# Patient Record
Sex: FEMALE | Race: WHITE | NOT HISPANIC OR LATINO
[De-identification: names, ages, dates, MRNs, and addresses within clinical notes are randomized per-mention and may not be internally consistent; named-entity substitution may affect disease eponyms.]

---

## 2017-01-09 ENCOUNTER — APPOINTMENT (OUTPATIENT)
Dept: SURGERY | Facility: CLINIC | Age: 22
End: 2017-01-09

## 2017-01-09 VITALS
WEIGHT: 131 LBS | SYSTOLIC BLOOD PRESSURE: 105 MMHG | TEMPERATURE: 97.5 F | DIASTOLIC BLOOD PRESSURE: 66 MMHG | OXYGEN SATURATION: 99 % | BODY MASS INDEX: 23.21 KG/M2 | HEART RATE: 60 BPM | HEIGHT: 63 IN

## 2017-01-09 DIAGNOSIS — K82.9 DISEASE OF GALLBLADDER, UNSPECIFIED: ICD-10-CM

## 2017-01-09 DIAGNOSIS — R63.4 ABNORMAL WEIGHT LOSS: ICD-10-CM

## 2017-01-10 PROBLEM — R63.4 RECENT WEIGHT LOSS: Status: ACTIVE | Noted: 2017-01-10

## 2017-01-10 LAB
ALBUMIN SERPL ELPH-MCNC: 4.8 G/DL
ALP BLD-CCNC: 67 U/L
ALT SERPL-CCNC: 9 U/L
ANION GAP SERPL CALC-SCNC: 14 MMOL/L
APTT BLD: 32.1 SEC
AST SERPL-CCNC: 18 U/L
BASOPHILS # BLD AUTO: 0.03 K/UL
BASOPHILS NFR BLD AUTO: 0.3 %
BILIRUB SERPL-MCNC: 0.4 MG/DL
BUN SERPL-MCNC: 12 MG/DL
CALCIUM SERPL-MCNC: 9.9 MG/DL
CHLORIDE SERPL-SCNC: 100 MMOL/L
CO2 SERPL-SCNC: 24 MMOL/L
CREAT SERPL-MCNC: 0.8 MG/DL
EOSINOPHIL # BLD AUTO: 0.07 K/UL
EOSINOPHIL NFR BLD AUTO: 0.8 %
GLUCOSE SERPL-MCNC: 79 MG/DL
HCT VFR BLD CALC: 38.4 %
HGB BLD-MCNC: 13.1 G/DL
IMM GRANULOCYTES NFR BLD AUTO: 0.2 %
INR PPP: 1.07 RATIO
LYMPHOCYTES # BLD AUTO: 2.76 K/UL
LYMPHOCYTES NFR BLD AUTO: 30.7 %
MAN DIFF?: NORMAL
MCHC RBC-ENTMCNC: 32.4 PG
MCHC RBC-ENTMCNC: 34.1 GM/DL
MCV RBC AUTO: 95 FL
MONOCYTES # BLD AUTO: 0.51 K/UL
MONOCYTES NFR BLD AUTO: 5.7 %
NEUTROPHILS # BLD AUTO: 5.6 K/UL
NEUTROPHILS NFR BLD AUTO: 62.3 %
PLATELET # BLD AUTO: 249 K/UL
POTASSIUM SERPL-SCNC: 4.3 MMOL/L
PROT SERPL-MCNC: 7.8 G/DL
PT BLD: 12.1 SEC
RBC # BLD: 4.04 M/UL
RBC # FLD: 12.1 %
SODIUM SERPL-SCNC: 138 MMOL/L
TSH SERPL-ACNC: 1.18 UU/ML
WBC # FLD AUTO: 8.99 K/UL

## 2017-01-11 ENCOUNTER — RESULT REVIEW (OUTPATIENT)
Age: 22
End: 2017-01-11

## 2017-02-10 ENCOUNTER — FORM ENCOUNTER (OUTPATIENT)
Age: 22
End: 2017-02-10

## 2017-02-11 ENCOUNTER — OUTPATIENT (OUTPATIENT)
Dept: OUTPATIENT SERVICES | Facility: HOSPITAL | Age: 22
LOS: 1 days | End: 2017-02-11
Payer: COMMERCIAL

## 2017-02-11 PROCEDURE — 74177 CT ABD & PELVIS W/CONTRAST: CPT

## 2017-02-11 PROCEDURE — 74177 CT ABD & PELVIS W/CONTRAST: CPT | Mod: 26

## 2017-03-28 ENCOUNTER — EMERGENCY (EMERGENCY)
Facility: HOSPITAL | Age: 22
LOS: 1 days | Discharge: HOME | End: 2017-03-28
Attending: EMERGENCY MEDICINE | Admitting: EMERGENCY MEDICINE
Payer: COMMERCIAL

## 2017-03-28 VITALS
WEIGHT: 139.99 LBS | TEMPERATURE: 100 F | RESPIRATION RATE: 19 BRPM | OXYGEN SATURATION: 98 % | HEART RATE: 99 BPM | SYSTOLIC BLOOD PRESSURE: 107 MMHG | DIASTOLIC BLOOD PRESSURE: 69 MMHG

## 2017-03-28 VITALS
DIASTOLIC BLOOD PRESSURE: 68 MMHG | SYSTOLIC BLOOD PRESSURE: 102 MMHG | TEMPERATURE: 101 F | RESPIRATION RATE: 18 BRPM | OXYGEN SATURATION: 98 % | HEART RATE: 68 BPM

## 2017-03-28 DIAGNOSIS — R50.9 FEVER, UNSPECIFIED: ICD-10-CM

## 2017-03-28 DIAGNOSIS — R05 COUGH: ICD-10-CM

## 2017-03-28 DIAGNOSIS — J02.9 ACUTE PHARYNGITIS, UNSPECIFIED: ICD-10-CM

## 2017-03-28 DIAGNOSIS — R42 DIZZINESS AND GIDDINESS: ICD-10-CM

## 2017-03-28 DIAGNOSIS — R55 SYNCOPE AND COLLAPSE: ICD-10-CM

## 2017-03-28 DIAGNOSIS — Z91.013 ALLERGY TO SEAFOOD: ICD-10-CM

## 2017-03-28 DIAGNOSIS — E86.0 DEHYDRATION: ICD-10-CM

## 2017-03-28 LAB
ALBUMIN SERPL ELPH-MCNC: 4 G/DL — SIGNIFICANT CHANGE UP (ref 3.4–5)
ALP SERPL-CCNC: 72 U/L — SIGNIFICANT CHANGE UP (ref 40–120)
ALT FLD-CCNC: 18 U/L — SIGNIFICANT CHANGE UP (ref 12–42)
ANION GAP SERPL CALC-SCNC: 8 MMOL/L — LOW (ref 9–16)
APPEARANCE UR: CLEAR — SIGNIFICANT CHANGE UP
AST SERPL-CCNC: 12 U/L — LOW (ref 15–37)
BASOPHILS NFR BLD AUTO: 0.2 % — SIGNIFICANT CHANGE UP (ref 0–2)
BILIRUB SERPL-MCNC: 0.4 MG/DL — SIGNIFICANT CHANGE UP (ref 0.2–1.2)
BILIRUB UR-MCNC: NEGATIVE — SIGNIFICANT CHANGE UP
BUN SERPL-MCNC: 6 MG/DL — LOW (ref 7–23)
CALCIUM SERPL-MCNC: 8.6 MG/DL — SIGNIFICANT CHANGE UP (ref 8.5–10.5)
CHLORIDE SERPL-SCNC: 100 MMOL/L — SIGNIFICANT CHANGE UP (ref 96–108)
CO2 SERPL-SCNC: 26 MMOL/L — SIGNIFICANT CHANGE UP (ref 22–31)
COLOR SPEC: YELLOW — SIGNIFICANT CHANGE UP
CREAT SERPL-MCNC: 0.99 MG/DL — SIGNIFICANT CHANGE UP (ref 0.5–1.3)
DIFF PNL FLD: NEGATIVE — SIGNIFICANT CHANGE UP
EOSINOPHIL NFR BLD AUTO: 1 % — SIGNIFICANT CHANGE UP (ref 0–6)
EXTRA BLUE TOP TUBE: SIGNIFICANT CHANGE UP
GLUCOSE SERPL-MCNC: 105 MG/DL — HIGH (ref 70–99)
GLUCOSE UR QL: NEGATIVE — SIGNIFICANT CHANGE UP
HCT VFR BLD CALC: 35 % — SIGNIFICANT CHANGE UP (ref 34.5–45)
HGB BLD-MCNC: 11.9 G/DL — SIGNIFICANT CHANGE UP (ref 11.5–15.5)
KETONES UR-MCNC: 15 MG/DL
LEUKOCYTE ESTERASE UR-ACNC: NEGATIVE — SIGNIFICANT CHANGE UP
LIDOCAIN IGE QN: 110 U/L — SIGNIFICANT CHANGE UP (ref 73–393)
LYMPHOCYTES # BLD AUTO: 7 % — LOW (ref 13–44)
MCHC RBC-ENTMCNC: 32.5 PG — SIGNIFICANT CHANGE UP (ref 27–34)
MCHC RBC-ENTMCNC: 34 G/DL — SIGNIFICANT CHANGE UP (ref 32–36)
MCV RBC AUTO: 95.6 FL — SIGNIFICANT CHANGE UP (ref 80–100)
MONOCYTES NFR BLD AUTO: 6.2 % — SIGNIFICANT CHANGE UP (ref 2–14)
NEUTROPHILS NFR BLD AUTO: 85.6 % — HIGH (ref 43–77)
NITRITE UR-MCNC: NEGATIVE — SIGNIFICANT CHANGE UP
PH UR: 8.5 — HIGH (ref 4–8)
PLATELET # BLD AUTO: 243 K/UL — SIGNIFICANT CHANGE UP (ref 150–400)
POTASSIUM SERPL-MCNC: 3.5 MMOL/L — SIGNIFICANT CHANGE UP (ref 3.5–5.3)
POTASSIUM SERPL-SCNC: 3.5 MMOL/L — SIGNIFICANT CHANGE UP (ref 3.5–5.3)
PROT SERPL-MCNC: 7.3 G/DL — SIGNIFICANT CHANGE UP (ref 6.4–8.2)
PROT UR-MCNC: NEGATIVE MG/DL — SIGNIFICANT CHANGE UP
RBC # BLD: 3.66 M/UL — LOW (ref 3.8–5.2)
RBC # FLD: 11.6 % — SIGNIFICANT CHANGE UP (ref 10.3–16.9)
SODIUM SERPL-SCNC: 134 MMOL/L — LOW (ref 135–145)
SP GR SPEC: 1.01 — SIGNIFICANT CHANGE UP (ref 1–1.03)
UROBILINOGEN FLD QL: 0.2 E.U./DL — SIGNIFICANT CHANGE UP
WBC # BLD: 8.9 K/UL — SIGNIFICANT CHANGE UP (ref 3.8–10.5)
WBC # FLD AUTO: 8.9 K/UL — SIGNIFICANT CHANGE UP (ref 3.8–10.5)

## 2017-03-28 PROCEDURE — 71020: CPT | Mod: 26

## 2017-03-28 PROCEDURE — 83690 ASSAY OF LIPASE: CPT

## 2017-03-28 PROCEDURE — 82550 ASSAY OF CK (CPK): CPT

## 2017-03-28 PROCEDURE — 82553 CREATINE MB FRACTION: CPT

## 2017-03-28 PROCEDURE — 93308 TTE F-UP OR LMTD: CPT

## 2017-03-28 PROCEDURE — 76705 ECHO EXAM OF ABDOMEN: CPT | Mod: 26

## 2017-03-28 PROCEDURE — 93010 ELECTROCARDIOGRAM REPORT: CPT | Mod: NC

## 2017-03-28 PROCEDURE — 93308 TTE F-UP OR LMTD: CPT | Mod: 26

## 2017-03-28 PROCEDURE — 85025 COMPLETE CBC W/AUTO DIFF WBC: CPT

## 2017-03-28 PROCEDURE — 99284 EMERGENCY DEPT VISIT MOD MDM: CPT | Mod: 25

## 2017-03-28 PROCEDURE — 80053 COMPREHEN METABOLIC PANEL: CPT

## 2017-03-28 PROCEDURE — 93005 ELECTROCARDIOGRAM TRACING: CPT

## 2017-03-28 PROCEDURE — 76705 ECHO EXAM OF ABDOMEN: CPT

## 2017-03-28 PROCEDURE — 71046 X-RAY EXAM CHEST 2 VIEWS: CPT

## 2017-03-28 PROCEDURE — 81003 URINALYSIS AUTO W/O SCOPE: CPT

## 2017-03-28 PROCEDURE — 84484 ASSAY OF TROPONIN QUANT: CPT

## 2017-03-28 PROCEDURE — 87086 URINE CULTURE/COLONY COUNT: CPT

## 2017-03-28 RX ORDER — SODIUM CHLORIDE 9 MG/ML
1000 INJECTION INTRAMUSCULAR; INTRAVENOUS; SUBCUTANEOUS ONCE
Qty: 0 | Refills: 0 | Status: COMPLETED | OUTPATIENT
Start: 2017-03-28 | End: 2017-03-28

## 2017-03-28 RX ORDER — ACETAMINOPHEN 500 MG
650 TABLET ORAL ONCE
Qty: 0 | Refills: 0 | Status: DISCONTINUED | OUTPATIENT
Start: 2017-03-28 | End: 2017-04-01

## 2017-03-28 RX ADMIN — SODIUM CHLORIDE 2000 MILLILITER(S): 9 INJECTION INTRAMUSCULAR; INTRAVENOUS; SUBCUTANEOUS at 10:00

## 2017-03-28 NOTE — ED PROVIDER NOTE - MEDICAL DECISION MAKING DETAILS
Case d/w Dr. Salguero,  20 yo F with pmhx gallstones presents with lightheadedness. Pt woke up this morning feeling lightheaded and dehydrated, when she got out of bed and poured herself a glass of water she passed out. Pt reports symptoms at this time mostly resolved.  On exam pt with dry mucous membranes, pt with nonfocal neuro exam.  Plan to check EKG, basic labs and txt with ivf's and reassess. Case d/w Dr. Salguero,  22 yo F with pmhx gallstones presents with lightheadedness. Pt woke up this morning feeling lightheaded and dehydrated, when she got out of bed and poured herself a glass of water she passed out. Pt reports symptoms at this time mostly resolved.  On exam pt with dry mucous membranes, pt with nonfocal neuro exam.  Plan to check EKG, basic labs and txt with ivf's and reassess. pt reassessed and reports feeling much improved

## 2017-03-28 NOTE — ED ADULT TRIAGE NOTE - CHIEF COMPLAINT QUOTE
I felt dizzy this morning while I was getting water and fell and hit my head.  + loc.  Also c/o RLQ abd pain x few months but intermittently worse this AM and fever last night.  Took tylenol 9pm. Hx: Gallstones.  Scheduled for gallstone removal in two weeks. Sent in by Dr. Daniel Sanchez. No hx of falls

## 2017-03-28 NOTE — ED PROVIDER NOTE - ATTENDING CONTRIBUTION TO CARE
20 yo F with hx of gallstones, scheduled for surgery next week, presenting with lightheadedness and syncope upon standing from lying position in bed, hitting occiput on ground.  Denies headache other traumatic pain.  Has been dealing with intermittent nausea with food intake and intermittent RUQ pain. Currently no ab pain.  Past 2 days had viral syndrome of cough, sore throat, body aches and subjective fever.  Denies chest pain, sob, rash or LE edema.  Pt has not been eating or drinking well.  Pt well on exam, VS noted with low grade temperature present.  Belly soft nontender, no murphys sign.  No signs of trauma to head.  Nl neuro exam.  Nl cardiac exam no murmurs.  EKG noted with nonspecific ST abn, no old to compare.  Doubt related to current condition.  Labs sent and reassurring.  Bedside sono neg for pericardial effusion and acute mrery.  Given context likely dehydration, responsive to IVF due to gallstones and viral syndrome as cause of syncope.  Do not suspect AMI, PE, myocarditis, pericarditis, tamponade, stroke, acute merry or ICH from syncope trauma.  Plan outpt fu and continued obs of symptoms, PO fluids and rest.

## 2017-03-28 NOTE — ED PROVIDER NOTE - ENMT, MLM
Airway patent, Nasal mucosa clear. Dry mucous membranes, Throat has no vesicles, no oropharyngeal exudates and uvula is midline.

## 2017-03-29 LAB
CULTURE RESULTS: SIGNIFICANT CHANGE UP
SPECIMEN SOURCE: SIGNIFICANT CHANGE UP

## 2017-04-13 ENCOUNTER — RESULT REVIEW (OUTPATIENT)
Age: 22
End: 2017-04-13

## 2017-04-13 VITALS
DIASTOLIC BLOOD PRESSURE: 59 MMHG | HEART RATE: 72 BPM | TEMPERATURE: 99 F | HEIGHT: 63 IN | WEIGHT: 138.67 LBS | RESPIRATION RATE: 16 BRPM | SYSTOLIC BLOOD PRESSURE: 108 MMHG | OXYGEN SATURATION: 100 %

## 2017-04-14 ENCOUNTER — APPOINTMENT (OUTPATIENT)
Dept: SURGERY | Facility: HOSPITAL | Age: 22
End: 2017-04-14

## 2017-04-14 ENCOUNTER — OUTPATIENT (OUTPATIENT)
Dept: OUTPATIENT SERVICES | Facility: HOSPITAL | Age: 22
LOS: 1 days | Discharge: HOME | End: 2017-04-14
Payer: COMMERCIAL

## 2017-04-14 VITALS
DIASTOLIC BLOOD PRESSURE: 71 MMHG | SYSTOLIC BLOOD PRESSURE: 126 MMHG | TEMPERATURE: 97 F | OXYGEN SATURATION: 99 % | RESPIRATION RATE: 12 BRPM | HEART RATE: 69 BPM

## 2017-04-14 DIAGNOSIS — K80.10 CALCULUS OF GALLBLADDER WITH CHRONIC CHOLECYSTITIS WITHOUT OBSTRUCTION: ICD-10-CM

## 2017-04-14 PROCEDURE — 88304 TISSUE EXAM BY PATHOLOGIST: CPT

## 2017-04-14 PROCEDURE — 47562 LAPAROSCOPIC CHOLECYSTECTOMY: CPT

## 2017-04-14 RX ORDER — ONDANSETRON 8 MG/1
4 TABLET, FILM COATED ORAL EVERY 6 HOURS
Qty: 0 | Refills: 0 | Status: DISCONTINUED | OUTPATIENT
Start: 2017-04-14 | End: 2017-04-14

## 2017-04-14 RX ORDER — METOCLOPRAMIDE HCL 10 MG
10 TABLET ORAL ONCE
Qty: 0 | Refills: 0 | Status: DISCONTINUED | OUTPATIENT
Start: 2017-04-14 | End: 2017-04-14

## 2017-04-14 NOTE — CHART NOTE - NSCHARTNOTEFT_GEN_A_CORE
Brief Operative Note    Attending: Daniel Arvizu    Resident:  Marco Sanchez    Preoperative Diagnosis: K81.1  Gallbladder disease  No significant past surgical history      Postoperative Diagnosis: same    Procedure: laparoscopic cholecystectomy    Findings: noninflamed, nonperforated gallbladder    EBL: minimal    Complications: none    Condition upon return to PACU: stable

## 2017-04-21 LAB — SURGICAL PATHOLOGY STUDY: SIGNIFICANT CHANGE UP

## 2017-05-02 ENCOUNTER — APPOINTMENT (OUTPATIENT)
Dept: SURGERY | Facility: CLINIC | Age: 22
End: 2017-05-02

## 2017-05-02 VITALS
SYSTOLIC BLOOD PRESSURE: 137 MMHG | DIASTOLIC BLOOD PRESSURE: 76 MMHG | HEIGHT: 63 IN | BODY MASS INDEX: 23.74 KG/M2 | OXYGEN SATURATION: 99 % | TEMPERATURE: 98.2 F | WEIGHT: 134 LBS | HEART RATE: 66 BPM

## 2017-05-02 DIAGNOSIS — K81.1 CHRONIC CHOLECYSTITIS: ICD-10-CM

## 2017-05-02 DIAGNOSIS — K80.20 CALCULUS OF GALLBLADDER W/OUT CHOLECYSTITIS W/OUT OBSTRUCTION: ICD-10-CM

## 2017-05-03 PROBLEM — K81.1 CHRONIC CHOLECYSTITIS: Status: ACTIVE | Noted: 2017-01-10

## 2017-05-03 PROBLEM — K80.20 GALLSTONE: Status: ACTIVE | Noted: 2017-01-09

## 2017-05-03 RX ORDER — OXYCODONE AND ACETAMINOPHEN 5; 325 MG/1; MG/1
5-325 TABLET ORAL
Qty: 20 | Refills: 0 | Status: COMPLETED | COMMUNITY
Start: 2017-04-15 | End: 2017-05-02

## 2017-06-06 ENCOUNTER — EMERGENCY (EMERGENCY)
Facility: HOSPITAL | Age: 22
LOS: 1 days | Discharge: HOME | End: 2017-06-06
Attending: EMERGENCY MEDICINE | Admitting: EMERGENCY MEDICINE
Payer: COMMERCIAL

## 2017-06-06 VITALS
TEMPERATURE: 99 F | SYSTOLIC BLOOD PRESSURE: 102 MMHG | HEART RATE: 86 BPM | DIASTOLIC BLOOD PRESSURE: 64 MMHG | OXYGEN SATURATION: 98 % | RESPIRATION RATE: 18 BRPM

## 2017-06-06 VITALS
RESPIRATION RATE: 18 BRPM | DIASTOLIC BLOOD PRESSURE: 71 MMHG | OXYGEN SATURATION: 95 % | WEIGHT: 138.01 LBS | HEART RATE: 109 BPM | TEMPERATURE: 101 F | SYSTOLIC BLOOD PRESSURE: 104 MMHG | HEIGHT: 63 IN

## 2017-06-06 DIAGNOSIS — N39.0 URINARY TRACT INFECTION, SITE NOT SPECIFIED: ICD-10-CM

## 2017-06-06 DIAGNOSIS — Z79.1 LONG TERM (CURRENT) USE OF NON-STEROIDAL ANTI-INFLAMMATORIES (NSAID): ICD-10-CM

## 2017-06-06 DIAGNOSIS — J02.9 ACUTE PHARYNGITIS, UNSPECIFIED: ICD-10-CM

## 2017-06-06 DIAGNOSIS — R50.9 FEVER, UNSPECIFIED: ICD-10-CM

## 2017-06-06 DIAGNOSIS — Z91.013 ALLERGY TO SEAFOOD: ICD-10-CM

## 2017-06-06 LAB
APPEARANCE UR: (no result)
BACTERIA # UR AUTO: PRESENT /HPF
BILIRUB UR-MCNC: (no result)
COLOR SPEC: YELLOW — SIGNIFICANT CHANGE UP
DIFF PNL FLD: (no result)
EPI CELLS # UR: (no result) /HPF
GLUCOSE UR QL: NEGATIVE — SIGNIFICANT CHANGE UP
KETONES UR-MCNC: 40 MG/DL
LEUKOCYTE ESTERASE UR-ACNC: (no result)
NITRITE UR-MCNC: NEGATIVE — SIGNIFICANT CHANGE UP
PH UR: 7.5 — SIGNIFICANT CHANGE UP (ref 5–8)
PROT UR-MCNC: 100 MG/DL
RBC CASTS # UR COMP ASSIST: (no result) /HPF
SP GR SPEC: 1.01 — SIGNIFICANT CHANGE UP (ref 1–1.03)
UROBILINOGEN FLD QL: 1 E.U./DL — SIGNIFICANT CHANGE UP
WBC UR QL: > 10 /HPF

## 2017-06-06 PROCEDURE — 85610 PROTHROMBIN TIME: CPT

## 2017-06-06 PROCEDURE — 87086 URINE CULTURE/COLONY COUNT: CPT

## 2017-06-06 PROCEDURE — 85730 THROMBOPLASTIN TIME PARTIAL: CPT

## 2017-06-06 PROCEDURE — 96374 THER/PROPH/DIAG INJ IV PUSH: CPT

## 2017-06-06 PROCEDURE — 99284 EMERGENCY DEPT VISIT MOD MDM: CPT | Mod: 25

## 2017-06-06 PROCEDURE — 80053 COMPREHEN METABOLIC PANEL: CPT

## 2017-06-06 PROCEDURE — 85025 COMPLETE CBC W/AUTO DIFF WBC: CPT

## 2017-06-06 PROCEDURE — 36415 COLL VENOUS BLD VENIPUNCTURE: CPT

## 2017-06-06 PROCEDURE — 83690 ASSAY OF LIPASE: CPT

## 2017-06-06 PROCEDURE — 83605 ASSAY OF LACTIC ACID: CPT

## 2017-06-06 PROCEDURE — 87040 BLOOD CULTURE FOR BACTERIA: CPT

## 2017-06-06 PROCEDURE — 81001 URINALYSIS AUTO W/SCOPE: CPT

## 2017-06-06 RX ORDER — AZITHROMYCIN 500 MG/1
500 TABLET, FILM COATED ORAL ONCE
Qty: 0 | Refills: 0 | Status: COMPLETED | OUTPATIENT
Start: 2017-06-06 | End: 2017-06-06

## 2017-06-06 RX ORDER — ACETAMINOPHEN 500 MG
650 TABLET ORAL ONCE
Qty: 0 | Refills: 0 | Status: COMPLETED | OUTPATIENT
Start: 2017-06-06 | End: 2017-06-06

## 2017-06-06 RX ORDER — SODIUM CHLORIDE 9 MG/ML
1000 INJECTION INTRAMUSCULAR; INTRAVENOUS; SUBCUTANEOUS ONCE
Qty: 0 | Refills: 0 | Status: COMPLETED | OUTPATIENT
Start: 2017-06-06 | End: 2017-06-06

## 2017-06-06 RX ORDER — KETOROLAC TROMETHAMINE 30 MG/ML
30 SYRINGE (ML) INJECTION ONCE
Qty: 0 | Refills: 0 | Status: DISCONTINUED | OUTPATIENT
Start: 2017-06-06 | End: 2017-06-06

## 2017-06-06 RX ORDER — AZITHROMYCIN 500 MG/1
1 TABLET, FILM COATED ORAL
Qty: 4 | Refills: 0 | OUTPATIENT
Start: 2017-06-06 | End: 2017-06-10

## 2017-06-06 RX ORDER — AZTREONAM 2 G
1 VIAL (EA) INJECTION
Qty: 14 | Refills: 0 | OUTPATIENT
Start: 2017-06-06 | End: 2017-06-13

## 2017-06-06 RX ADMIN — Medication 30 MILLIGRAM(S): at 06:02

## 2017-06-06 RX ADMIN — AZITHROMYCIN 500 MILLIGRAM(S): 500 TABLET, FILM COATED ORAL at 06:02

## 2017-06-06 RX ADMIN — SODIUM CHLORIDE 2000 MILLILITER(S): 9 INJECTION INTRAMUSCULAR; INTRAVENOUS; SUBCUTANEOUS at 06:02

## 2017-06-06 RX ADMIN — Medication 650 MILLIGRAM(S): at 06:02

## 2017-06-06 RX ADMIN — SODIUM CHLORIDE 2000 MILLILITER(S): 9 INJECTION INTRAMUSCULAR; INTRAVENOUS; SUBCUTANEOUS at 06:06

## 2017-06-06 NOTE — ED ADULT NURSE NOTE - CHIEF COMPLAINT QUOTE
Pt presents to [facility] ED with c/o FEVER intermittently since 5 days ago. Recent removal of GALL BLADDER 2 months ago.

## 2017-06-06 NOTE — ED PROVIDER NOTE - MEDICAL DECISION MAKING DETAILS
20 yo presents with fevers, ST, malaise and bodyaches. (+) pharyngitis on exam. UA also positive for infection. Pt treated for bacterial pharyngitis and UTI. Feeling much  better post IVF and meds in the ED. Rxs given. Advised tylenol/ ibuprofen prn fevers.

## 2017-06-06 NOTE — ED PROVIDER NOTE - ENMT, MLM
Airway patent, Nasal mucosa clear. Mouth with normal mucosa. Throat: (+) oropharyngeal/ tonsillar exudates with erythema, No PTA, no trismus. and uvula is midline. No meningismus.

## 2017-06-06 NOTE — ED ADULT TRIAGE NOTE - CHIEF COMPLAINT QUOTE
Pt presents to [facility] ED with c/o FEVER intermittently since 5 days ago. Recent removal of GALL BLADDER 2 months ago.

## 2017-06-06 NOTE — ED PROVIDER NOTE - OBJECTIVE STATEMENT
22 yo female, hx of cholecystectomy, presents with fevers, malaise, bodyaches, headache, sore throat and crampy abd pains X 5 days. N 22 yo female, hx of cholecystectomy, presents with fevers, malaise, bodyaches, headache, sore throat and crampy abd pains X 5 days. Denies urinary sxs/N/V/ cough/ congestion/ rhinorrhea. No other complaints.

## 2017-06-06 NOTE — ED PROVIDER NOTE - HEME LYMPH, MLM
(+) anterior cervical painful mobile lymphadenopathy (+) anterior painful mobile cervical lymphadenopathy

## 2017-06-06 NOTE — ED ADULT NURSE NOTE - CHPI ED SYMPTOMS NEG
no vomiting/no chills/no numbness/no decreased eating/drinking/no weakness/no tingling/no dizziness/no nausea

## 2017-06-07 LAB
CULTURE RESULTS: SIGNIFICANT CHANGE UP
SPECIMEN SOURCE: SIGNIFICANT CHANGE UP

## 2020-12-29 NOTE — ED ADULT NURSE NOTE - CHPI ED SYMPTOMS POS
FEVER Ataxia       Ataxia is a condition that causes unsteadiness when walking and standing, poor coordination of body movements, and difficulty keeping a straight (upright) posture. It occurs because of a problem with the part of the brain that controls coordination and stability (cerebellum).  Ataxia can develop later in life (acquired ataxia), during your 20s or 30s or even into your 60s or later. This type of ataxia develops when another medical condition, such as a stroke, damages the cerebellum. Ataxia also may be present early in life (non-acquired ataxia). There are two main types of non-acquired ataxia:  •Congenital. This type is present at birth.      •Hereditary. This type is passed from parent to child. The most common form of hereditary non-acquired ataxia is Friedreich ataxia.        What are the causes?  Acquired ataxia may be caused by:  •Changes in the nervous system (neurodegenerative changes).      •Changes throughout the body (systemic disorders).    •A lot of exposure to:  •Certain medicines such as phenytoin and lithium.      •Solvents. These are cleaning fluids such as paint thinner, nail polish remover, , and degreasers.        •Alcohol abuse (alcoholism).    •Medical conditions, such as:  •Celiac disease.      •Hypothyroidism.      •A lack (deficiency) of vitamin E, vitamin B12, or thiamine.      •Brain tumors.      •Multiple sclerosis.      •Cerebral palsy.      •Stroke.      •Paraneoplastic syndromes.      •Viral infections.      •Head injury.      •Malnutrition.        Congenital and hereditary ataxia are caused by problems that are present in genes before birth.      What are the signs or symptoms?  Signs and symptoms of ataxia vary depending on the cause. They may include:  •Being unsteady.      •Walking with the legs wide apart (wide stance) to keep one's balance.      •Uncontrolled shaking (tremor).      •Poorly coordinated body movements.      •Difficulty maintaining an upright posture.      •Fatigue.      •Changes in speech.      •Changes in vision.      •Involuntary eye movements (nystagmus).      •Difficulty swallowing.      •Difficulty writing.      •Muscle tightening that you cannot control (muscle spasms).        How is this diagnosed?  Ataxia may be diagnosed based on:  •Your personal and family medical history.      •A physical exam.      •Imaging tests, such as a CT scan or MRI.      •Spinal tap (lumbar puncture). This procedure involves using a needle to take a sample of the fluid around your brain and spinal cord.      •Genetic testing.        How is this treated?    The underlying condition that causes your ataxia needs to be treated. If the cause is a brain tumor, you may need surgery.  Treatment also focuses on helping you live with ataxia and improving your quality of life (supportive treatments). This may involve:  •Learning ways to improve coordination and move around more carefully (physical therapy).      •Learning ways to improve your ability to do daily tasks, such as bathing and feeding yourself (occupational therapy).      •Using devices to help you move around, eat, or communicate (assistive devices), such as a walker, modified eating utensils, and communication aids.      •Learning ways to improve speech and swallowing (speech therapy).        Follow these instructions at home:    Preventing falls     •Lie down right away if you become very unsteady, dizzy, or nauseous, or if you feel like you are going to faint. Do not get up until all of those feelings pass.      •Keep your home well-lit. Use night-lights as needed.      •Remove tripping hazards, such as rugs, cords, and clutter.      •Install grab bars by the toilet and in the tub and shower.      •Use assistive devices such as a cane, walker, or wheelchair as needed to keep your balance.      General instructions     • Do not drink alcohol.      •Ask your health care provider what activities are safe for you, and what activities you should avoid.      •Take over-the-counter and prescription medicines only as told by your health care provider.        Get help right away if you:    •Have unsteadiness that suddenly worsens.    •Have any of these:  •Severe headaches.      •Chest pain.      •Abdominal pain.      •Weakness or numbness on one side of your body.      •Vision problems.      •Difficulty speaking.      •An irregular heartbeat.      •A very fast pulse.        •Feel confused.        Summary    •Ataxia is a condition that causes unsteadiness when walking and standing, poor coordination of body movements, and difficulty keeping a straight (upright) posture.      •Ataxia occurs because of a problem with the part of the brain that controls coordination and stability (cerebellum).      •The underlying condition that causes your ataxia needs to be treated. Treatment also focuses on helping you live with ataxia and improving your quality of life (supportive treatments).      •Lie down right away if you become very unsteady, dizzy, or nauseous, or if you feel like you are going to faint.      This information is not intended to replace advice given to you by your health care provider. Make sure you discuss any questions you have with your health care provider.      Document Revised: 11/30/2018 Document Reviewed: 10/19/2018    [employer] Patient Education © 2020 [employer]

## 2021-09-12 NOTE — ASU PREOP CHECKLIST - BP NONINVASIVE SYSTOLIC (MM HG)
40 yo female presenting to ER with bilateral flank pain with subjective fever at home since mid week last week. reports increased frequency or urination as well as dysuria. no bladder or bowel incontinence no saddle anesthesia. pain worse with movemets. hx of kidney stones in the past but that this feels different. no recent abx treatment no nausea vomiting or diarrhea. reports mild lower abdominal pain. 108